# Patient Record
Sex: FEMALE | URBAN - METROPOLITAN AREA
[De-identification: names, ages, dates, MRNs, and addresses within clinical notes are randomized per-mention and may not be internally consistent; named-entity substitution may affect disease eponyms.]

---

## 2021-03-30 NOTE — PATIENT DISCUSSION
Comments: Benefits, risks, and possible complications of procedure explained to patient/caregiver who verbalized understanding and gave verbal consent.

## 2022-01-12 ENCOUNTER — IMPORTED ENCOUNTER (OUTPATIENT)
Dept: URBAN - METROPOLITAN AREA CLINIC 31 | Facility: CLINIC | Age: 32
End: 2022-01-12

## 2022-01-12 PROCEDURE — 92014 COMPRE OPH EXAM EST PT 1/>: CPT

## 2022-01-12 PROCEDURE — 92015 DETERMINE REFRACTIVE STATE: CPT

## 2022-01-12 PROCEDURE — 92310 CONTACT LENS FITTING OU: CPT

## 2022-01-12 NOTE — PATIENT DISCUSSION
1.  Refractive error Annual Good ocular health documented. Discussed options of glasses contacts or refractive surgery. Discussed importance of annual eye exams. 2.  Astigmatism3. Insertion removal and care regimen successfully completed today. and Dispense trial OS. To DC and call if any problems. F/U CL check may need to re-refract OD and use CL if imbalance bothersome.

## 2022-01-12 NOTE — PATIENT DISCUSSION
Insertion removal and care regimen successfully completed today. and Dispense trial OS. To DC and call if any problems.

## 2022-03-07 ENCOUNTER — IMPORTED ENCOUNTER (OUTPATIENT)
Dept: URBAN - METROPOLITAN AREA CLINIC 31 | Facility: CLINIC | Age: 32
End: 2022-03-07

## 2022-03-07 PROCEDURE — V2520 CONTACT LENS HYDROPHILIC: HCPCS

## 2022-04-01 ASSESSMENT — TONOMETRY
OD_IOP_MMHG: 14
OS_IOP_MMHG: 14

## 2022-04-01 ASSESSMENT — VISUAL ACUITY
OS_CC: J114''
OD_CC: J114''
OD_CC: 20/25
OD_SC: 20/30
OS_SC: 20/40
OS_CC: 20/100
OD_CC: 20/25
OS_SC: 20/25+3

## 2023-07-24 ENCOUNTER — PREPPED CHART (OUTPATIENT)
Dept: URBAN - METROPOLITAN AREA CLINIC 31 | Facility: CLINIC | Age: 33
End: 2023-07-24

## 2023-07-27 ENCOUNTER — COMPREHENSIVE EXAM (OUTPATIENT)
Dept: URBAN - METROPOLITAN AREA CLINIC 31 | Facility: CLINIC | Age: 33
End: 2023-07-27

## 2023-07-27 DIAGNOSIS — H52.223: ICD-10-CM

## 2023-07-27 PROCEDURE — 92014 COMPRE OPH EXAM EST PT 1/>: CPT

## 2023-07-27 PROCEDURE — 92015 DETERMINE REFRACTIVE STATE: CPT

## 2023-07-27 ASSESSMENT — VISUAL ACUITY
OD_CC: J1
OD_CC: 20/25-2
OS_SC: 20/100
OD_SC: 20/30
OS_CC: 20/50
OS_CC: J1

## 2023-07-27 ASSESSMENT — TONOMETRY
OS_IOP_MMHG: 13
OD_IOP_MMHG: 13